# Patient Record
Sex: FEMALE | Race: WHITE | Employment: FULL TIME | URBAN - METROPOLITAN AREA
[De-identification: names, ages, dates, MRNs, and addresses within clinical notes are randomized per-mention and may not be internally consistent; named-entity substitution may affect disease eponyms.]

---

## 2024-06-28 PROBLEM — E66.812 CLASS 2 SEVERE OBESITY WITH SERIOUS COMORBIDITY IN ADULT (HCC): Status: ACTIVE | Noted: 2024-06-28

## 2024-08-02 ENCOUNTER — HOSPITAL ENCOUNTER (OUTPATIENT)
Age: 34
Discharge: HOME | End: 2024-08-02
Attending: OBSTETRICS & GYNECOLOGY | Admitting: OBSTETRICS & GYNECOLOGY
Payer: COMMERCIAL

## 2024-08-02 VITALS — DIASTOLIC BLOOD PRESSURE: 59 MMHG | SYSTOLIC BLOOD PRESSURE: 128 MMHG | HEART RATE: 70 BPM | RESPIRATION RATE: 18 BRPM

## 2024-08-02 LAB
ALBUMIN SERPL-MCNC: 3.2 G/DL (ref 3.5–5.2)
ALP SERPL-CCNC: 164 U/L (ref 35–104)
ALT SERPL-CCNC: 8 U/L (ref 0–32)
ANION GAP SERPL CALCULATED.3IONS-SCNC: 13 MMOL/L (ref 7–16)
AST SERPL-CCNC: 12 U/L (ref 0–31)
BACTERIA URNS QL MICRO: ABNORMAL
BASOPHILS # BLD: 0.02 K/UL (ref 0–0.2)
BASOPHILS NFR BLD: 0 % (ref 0–2)
BILIRUB SERPL-MCNC: 0.2 MG/DL (ref 0–1.2)
BILIRUB UR QL STRIP: NEGATIVE
BUN SERPL-MCNC: 7 MG/DL (ref 6–20)
CALCIUM SERPL-MCNC: 8.5 MG/DL (ref 8.6–10.2)
CHLORIDE SERPL-SCNC: 105 MMOL/L (ref 98–107)
CLARITY UR: CLEAR
CO2 SERPL-SCNC: 19 MMOL/L (ref 22–29)
COLOR UR: YELLOW
CREAT SERPL-MCNC: 0.5 MG/DL (ref 0.5–1)
CREAT UR-MCNC: 55.1 MG/DL (ref 29–226)
EOSINOPHIL # BLD: 0.1 K/UL (ref 0.05–0.5)
EOSINOPHILS RELATIVE PERCENT: 1 % (ref 0–6)
ERYTHROCYTE [DISTWIDTH] IN BLOOD BY AUTOMATED COUNT: 14.3 % (ref 11.5–15)
GFR, ESTIMATED: >90 ML/MIN/1.73M2
GLUCOSE SERPL-MCNC: 101 MG/DL (ref 74–99)
GLUCOSE UR STRIP-MCNC: NEGATIVE MG/DL
HCT VFR BLD AUTO: 34.7 % (ref 34–48)
HGB BLD-MCNC: 11.2 G/DL (ref 11.5–15.5)
HGB UR QL STRIP.AUTO: NEGATIVE
IMM GRANULOCYTES # BLD AUTO: <0.03 K/UL (ref 0–0.58)
IMM GRANULOCYTES NFR BLD: 0 % (ref 0–5)
KETONES UR STRIP-MCNC: NEGATIVE MG/DL
LEUKOCYTE ESTERASE UR QL STRIP: NEGATIVE
LYMPHOCYTES NFR BLD: 2.34 K/UL (ref 1.5–4)
LYMPHOCYTES RELATIVE PERCENT: 32 % (ref 20–42)
MCH RBC QN AUTO: 28.1 PG (ref 26–35)
MCHC RBC AUTO-ENTMCNC: 32.3 G/DL (ref 32–34.5)
MCV RBC AUTO: 87.2 FL (ref 80–99.9)
MONOCYTES NFR BLD: 0.9 K/UL (ref 0.1–0.95)
MONOCYTES NFR BLD: 12 % (ref 2–12)
NEUTROPHILS NFR BLD: 53 % (ref 43–80)
NEUTS SEG NFR BLD: 3.86 K/UL (ref 1.8–7.3)
NITRITE UR QL STRIP: NEGATIVE
PH UR STRIP: 7 [PH] (ref 5–9)
PLATELET # BLD AUTO: 195 K/UL (ref 130–450)
PMV BLD AUTO: 10.2 FL (ref 7–12)
POTASSIUM SERPL-SCNC: 3.6 MMOL/L (ref 3.5–5)
PROT SERPL-MCNC: 6.3 G/DL (ref 6.4–8.3)
PROT UR STRIP-MCNC: NEGATIVE MG/DL
RBC # BLD AUTO: 3.98 M/UL (ref 3.5–5.5)
RBC #/AREA URNS HPF: ABNORMAL /HPF
SODIUM SERPL-SCNC: 137 MMOL/L (ref 132–146)
SP GR UR STRIP: 1.02 (ref 1–1.03)
TOTAL PROTEIN, URINE: 11 MG/DL (ref 0–12)
URATE SERPL-MCNC: 3.5 MG/DL (ref 2.4–5.7)
URINE TOTAL PROTEIN CREATININE RATIO: 0.2 (ref 0–0.2)
UROBILINOGEN UR STRIP-ACNC: 0.2 EU/DL (ref 0–1)
WBC #/AREA URNS HPF: ABNORMAL /HPF
WBC OTHER # BLD: 7.2 K/UL (ref 4.5–11.5)

## 2024-08-02 PROCEDURE — 80053 COMPREHEN METABOLIC PANEL: CPT

## 2024-08-02 PROCEDURE — 81001 URINALYSIS AUTO W/SCOPE: CPT

## 2024-08-02 PROCEDURE — 84550 ASSAY OF BLOOD/URIC ACID: CPT

## 2024-08-02 PROCEDURE — 85025 COMPLETE CBC W/AUTO DIFF WBC: CPT

## 2024-08-02 PROCEDURE — 99212 OFFICE O/P EST SF 10 MIN: CPT | Performed by: STUDENT IN AN ORGANIZED HEALTH CARE EDUCATION/TRAINING PROGRAM

## 2024-08-02 PROCEDURE — 84156 ASSAY OF PROTEIN URINE: CPT

## 2024-08-02 PROCEDURE — 82570 ASSAY OF URINE CREATININE: CPT

## 2024-08-02 NOTE — PROGRESS NOTES
Patient arrived to unit with complaints of decreased fetal movement since this morning. Patient reports she has anterior placenta, denies cramping, bleeding, or LOF. Placed on EFM, will notify house officer of arrival.

## 2024-08-02 NOTE — PROGRESS NOTES
Called Dr. Ivan to update regarding resulted labs, physician would like to cycle blood pressures until systolic less than 140. BP cycling every 20 min. Call physician back with update.

## 2024-08-02 NOTE — PROGRESS NOTES
Patient reports feeling movement, per Dr Ruelas reactive EFM tracing noted with accelerations and no decelerations. She denies any ctx pain or cramping. May take off of EFM and obtain [facility] labs, call Dr. Ivan with results.

## 2024-08-02 NOTE — H&P
Department of Obstetrics and Gynecology  Labor and Delivery  Attending History and Pysical       Subjective:   Charlie Nguyen is a 34 y.o. female  at 38w1d here for DFM. However since being in triage reports feeling baby move again.     -LOF, -VB, -Contractions  No preeclampsia symptoms of HA, visual changes,  RUQ pain.      OB History    Para Term  AB Living   1 0 0 0 0 0   SAB IAB Ectopic Molar Multiple Live Births   0 0 0 0 0 0      # Outcome Date GA Lbr Alvin/2nd Weight Sex Delivery Anes PTL Lv   1 Current                Review of Systems  Skin: no changes  All other review of systems negative    Past Medical History  Past Medical History:   Diagnosis Date    Asthma     HLD (hyperlipidemia)     Vitamin D deficiency        Past Surgical History  Past Surgical History:   Procedure Laterality Date    CHOLECYSTECTOMY      laparoscopic    TONSILLECTOMY AND ADENOIDECTOMY      WISDOM TOOTH EXTRACTION         Allergies  Allergies   Allergen Reactions    Iodides Hives and Itching       Family History: Non contributory    Social History: Denies smoking, alcohol, drugs    Physical Exam:  Vitals:    24 1600   BP: (!) 142/80   Pulse: 76   Resp: 18      CONSTITUTIONAL:  awake, alert, cooperative, no apparent distress  LUNGS:  No increased work of breathing  ABDOMEN:  no rebound or guarding . gravid  EXTREMETIES:  Minimal edema.    SVE: deferred    FHRT:  Baseline: normal  Variability: Moderate  Accels: Present  Decels: None     [place]: no reg ctxs    Membranes: Intact      BSUS: DVP 4.7    Assessment/Plan:      Charlie Nguyen is a 34 y.o.  at 38w1d here with DFM. Reactive tracing and reassuring DVP. However first BP is mild range. She reports being nervous before being put on monitor. Plan for [facility] labs and cycle Bps.  informed and will decide on discharge based on results    Mitchell Ruelas MD, MPH

## 2024-08-03 NOTE — PROGRESS NOTES
Patient given verbal and written discharge instructions with standard labor precautions instructed to keep follow up appointment with physician. Patient verbalizes understanding states no questions or concerns. Patient ambulatory off unit with family member.

## 2024-08-08 ENCOUNTER — ANESTHESIA (OUTPATIENT)
Dept: LABOR AND DELIVERY | Age: 34
End: 2024-08-08
Payer: COMMERCIAL

## 2024-08-08 ENCOUNTER — ANESTHESIA EVENT (OUTPATIENT)
Dept: LABOR AND DELIVERY | Age: 34
End: 2024-08-08
Payer: COMMERCIAL

## 2024-08-08 ENCOUNTER — HOSPITAL ENCOUNTER (INPATIENT)
Age: 34
LOS: 3 days | Discharge: HOME | End: 2024-08-11
Attending: OBSTETRICS & GYNECOLOGY | Admitting: OBSTETRICS & GYNECOLOGY
Payer: COMMERCIAL

## 2024-08-08 DIAGNOSIS — G89.18 POSTOPERATIVE PAIN: Primary | ICD-10-CM

## 2024-08-08 PROBLEM — Z3A.39 39 WEEKS GESTATION OF PREGNANCY: Status: ACTIVE | Noted: 2024-08-08

## 2024-08-08 LAB
ABO + RH BLD: NORMAL
AMNISURE, POC: POSITIVE
AMPHET UR QL SCN: NEGATIVE
ARM BAND NUMBER: NORMAL
BARBITURATES UR QL SCN: NEGATIVE
BENZODIAZ UR QL: NEGATIVE
BLOOD BANK SAMPLE EXPIRATION: NORMAL
BLOOD GROUP ANTIBODIES SERPL: NEGATIVE
BUPRENORPHINE UR QL: NEGATIVE
CANNABINOIDS UR QL SCN: NEGATIVE
COCAINE UR QL SCN: NEGATIVE
ERYTHROCYTE [DISTWIDTH] IN BLOOD BY AUTOMATED COUNT: 14.3 % (ref 11.5–15)
FENTANYL UR QL: NEGATIVE
HCT VFR BLD AUTO: 36.9 % (ref 34–48)
HGB BLD-MCNC: 11.8 G/DL (ref 11.5–15.5)
Lab: NORMAL
MCH RBC QN AUTO: 27.7 PG (ref 26–35)
MCHC RBC AUTO-ENTMCNC: 32 G/DL (ref 32–34.5)
MCV RBC AUTO: 86.6 FL (ref 80–99.9)
METHADONE UR QL: NEGATIVE
NEGATIVE QC PASS/FAIL: NORMAL
OPIATES UR QL SCN: NEGATIVE
OXYCODONE UR QL SCN: NEGATIVE
PCP UR QL SCN: NEGATIVE
PLATELET # BLD AUTO: 227 K/UL (ref 130–450)
PMV BLD AUTO: 10.8 FL (ref 7–12)
POSITIVE QC PASS/FAIL: NORMAL
RBC # BLD AUTO: 4.26 M/UL (ref 3.5–5.5)
TEST INFORMATION: NORMAL
WBC OTHER # BLD: 7.6 K/UL (ref 4.5–11.5)

## 2024-08-08 PROCEDURE — 86900 BLOOD TYPING SEROLOGIC ABO: CPT

## 2024-08-08 PROCEDURE — 3700000025 EPIDURAL BLOCK: Performed by: ANESTHESIOLOGY

## 2024-08-08 PROCEDURE — 85027 COMPLETE CBC AUTOMATED: CPT

## 2024-08-08 PROCEDURE — 80307 DRUG TEST PRSMV CHEM ANLYZR: CPT

## 2024-08-08 PROCEDURE — 2580000003 HC RX 258: Performed by: OBSTETRICS & GYNECOLOGY

## 2024-08-08 PROCEDURE — 51701 INSERT BLADDER CATHETER: CPT

## 2024-08-08 PROCEDURE — 86901 BLOOD TYPING SEROLOGIC RH(D): CPT

## 2024-08-08 PROCEDURE — 1220000001 HC SEMI PRIVATE L&D R&B

## 2024-08-08 PROCEDURE — 86850 RBC ANTIBODY SCREEN: CPT

## 2024-08-08 PROCEDURE — 6360000002 HC RX W HCPCS: Performed by: OBSTETRICS & GYNECOLOGY

## 2024-08-08 PROCEDURE — APPNB15 APP NON BILLABLE TIME 0-15 MINS

## 2024-08-08 RX ORDER — MISOPROSTOL 200 UG/1
400 TABLET ORAL PRN
Status: DISCONTINUED | OUTPATIENT
Start: 2024-08-08 | End: 2024-08-09

## 2024-08-08 RX ORDER — METHYLERGONOVINE MALEATE 0.2 MG/ML
200 INJECTION INTRAVENOUS PRN
Status: DISCONTINUED | OUTPATIENT
Start: 2024-08-08 | End: 2024-08-09

## 2024-08-08 RX ORDER — TERBUTALINE SULFATE 1 MG/ML
0.25 INJECTION, SOLUTION SUBCUTANEOUS
Status: DISCONTINUED | OUTPATIENT
Start: 2024-08-08 | End: 2024-08-09

## 2024-08-08 RX ORDER — ONDANSETRON 2 MG/ML
4 INJECTION INTRAMUSCULAR; INTRAVENOUS EVERY 6 HOURS PRN
Status: DISCONTINUED | OUTPATIENT
Start: 2024-08-08 | End: 2024-08-09

## 2024-08-08 RX ORDER — SODIUM CHLORIDE 0.9 % (FLUSH) 0.9 %
5-40 SYRINGE (ML) INJECTION EVERY 12 HOURS SCHEDULED
Status: DISCONTINUED | OUTPATIENT
Start: 2024-08-08 | End: 2024-08-09

## 2024-08-08 RX ORDER — SODIUM CHLORIDE, SODIUM LACTATE, POTASSIUM CHLORIDE, AND CALCIUM CHLORIDE .6; .31; .03; .02 G/100ML; G/100ML; G/100ML; G/100ML
500 INJECTION, SOLUTION INTRAVENOUS PRN
Status: DISCONTINUED | OUTPATIENT
Start: 2024-08-08 | End: 2024-08-09

## 2024-08-08 RX ORDER — SODIUM CHLORIDE 0.9 % (FLUSH) 0.9 %
5-40 SYRINGE (ML) INJECTION PRN
Status: DISCONTINUED | OUTPATIENT
Start: 2024-08-08 | End: 2024-08-09

## 2024-08-08 RX ORDER — ACETAMINOPHEN 650 MG
TABLET, EXTENDED RELEASE ORAL
Status: DISCONTINUED
Start: 2024-08-08 | End: 2024-08-09

## 2024-08-08 RX ORDER — TRANEXAMIC ACID 10 MG/ML
1000 INJECTION, SOLUTION INTRAVENOUS
Status: DISCONTINUED | OUTPATIENT
Start: 2024-08-08 | End: 2024-08-09

## 2024-08-08 RX ORDER — ACETAMINOPHEN 325 MG/1
650 TABLET ORAL EVERY 4 HOURS PRN
Status: DISCONTINUED | OUTPATIENT
Start: 2024-08-08 | End: 2024-08-10 | Stop reason: SDUPTHER

## 2024-08-08 RX ORDER — SODIUM CHLORIDE, SODIUM LACTATE, POTASSIUM CHLORIDE, CALCIUM CHLORIDE 600; 310; 30; 20 MG/100ML; MG/100ML; MG/100ML; MG/100ML
INJECTION, SOLUTION INTRAVENOUS CONTINUOUS
Status: DISCONTINUED | OUTPATIENT
Start: 2024-08-08 | End: 2024-08-09

## 2024-08-08 RX ORDER — NALOXONE HYDROCHLORIDE 0.4 MG/ML
INJECTION, SOLUTION INTRAMUSCULAR; INTRAVENOUS; SUBCUTANEOUS PRN
Status: DISCONTINUED | OUTPATIENT
Start: 2024-08-08 | End: 2024-08-09

## 2024-08-08 RX ORDER — ONDANSETRON 4 MG/1
4 TABLET, ORALLY DISINTEGRATING ORAL EVERY 6 HOURS PRN
Status: DISCONTINUED | OUTPATIENT
Start: 2024-08-08 | End: 2024-08-09

## 2024-08-08 RX ORDER — LIDOCAINE HYDROCHLORIDE 10 MG/ML
INJECTION, SOLUTION INFILTRATION; PERINEURAL
Status: DISCONTINUED
Start: 2024-08-08 | End: 2024-08-09

## 2024-08-08 RX ORDER — SODIUM CHLORIDE 9 MG/ML
25 INJECTION, SOLUTION INTRAVENOUS PRN
Status: DISCONTINUED | OUTPATIENT
Start: 2024-08-08 | End: 2024-08-09

## 2024-08-08 RX ORDER — CARBOPROST TROMETHAMINE 250 UG/ML
250 INJECTION, SOLUTION INTRAMUSCULAR PRN
Status: DISCONTINUED | OUTPATIENT
Start: 2024-08-08 | End: 2024-08-09

## 2024-08-08 RX ADMIN — SODIUM CHLORIDE, POTASSIUM CHLORIDE, SODIUM LACTATE AND CALCIUM CHLORIDE: 600; 310; 30; 20 INJECTION, SOLUTION INTRAVENOUS at 16:12

## 2024-08-08 RX ADMIN — Medication 1 MILLI-UNITS/MIN: at 09:06

## 2024-08-08 RX ADMIN — Medication 10 MILLI-UNITS/MIN: at 19:37

## 2024-08-08 RX ADMIN — SODIUM CHLORIDE, POTASSIUM CHLORIDE, SODIUM LACTATE AND CALCIUM CHLORIDE: 600; 310; 30; 20 INJECTION, SOLUTION INTRAVENOUS at 08:14

## 2024-08-08 RX ADMIN — Medication 14 MILLI-UNITS/MIN: at 21:57

## 2024-08-08 RX ADMIN — Medication 12 MILLI-UNITS/MIN: at 20:51

## 2024-08-08 NOTE — ANESTHESIA PROCEDURE NOTES
Epidural Block    Patient location during procedure: OB  Reason for block: labor epidural  Staffing  Performed: other anesthesia staff   Resident/CRNA: Constantin Knowles APRN - CRNA  Other anesthesia staff: Sissy Mosquera RN  Performed by: Constantin Knowles APRN - CRNA  Authorized by: Wenyd Carter DO    Epidural  Patient position: sitting  Prep: ChloraPrep  Patient monitoring: continuous pulse ox and frequent blood pressure checks  Approach: midline  Location: L3-4  Injection technique: [place] air  Provider prep: mask and sterile gloves  Needle  Needle type: Tuohy   Needle gauge: 17 G  Needle length: 3.5 in  Needle insertion depth: 7 cm  Catheter type: end hole  Catheter size: 20 G (20 G)  Catheter at skin depth: 15 cm  Test dose: negativeCatheter Secured: tegaderm and tape  Assessment  Sensory level: T4  Hemodynamics: stable  Attempts: 1  Outcomes: uncomplicated and patient tolerated procedure well  Preanesthetic Checklist  Completed: patient identified, IV checked, site marked, risks and benefits discussed, surgical/procedural consents, equipment checked, pre-op evaluation, timeout performed, anesthesia consent given, oxygen available and monitors applied/VS acknowledged

## 2024-08-08 NOTE — ANESTHESIA PRE PROCEDURE
Department of Anesthesiology  Preprocedure Note       Name:  Charlie Jesus   Age:  34 y.o.  :  1990                                          MRN:  [id number]         Date:  2024      Surgeon: * No surgeons listed *    Procedure: * No procedures listed *    Medications prior to admission:   Prior to Admission medications    Medication Sig Start Date End Date Taking? Authorizing Provider   DULERA 200-5 MCG/ACT inhaler Inhale 2 puffs into the lungs in the morning and 2 puffs in the evening. 24   ProviderRodger MD   Prenatal MV & Min w/FA-DHA (PRENATAL ADULT GUMMY/DHA/FA PO)  12/15/23   ProviderRodger MD   Cholecalciferol (VITAMIN D) 50 MCG ( UT) CAPS capsule Take by mouth    ProviderRodger MD   albuterol sulfate HFA (VENTOLIN HFA) 108 (90 Base) MCG/ACT inhaler Inhale 2 puffs into the lungs 4 times daily as needed for Wheezing 23   Kailyn Hudson DO       Current medications:    Current Facility-Administered Medications   Medication Dose Route Frequency Provider Last Rate Last Admin    terbutaline (BRETHINE) injection 0.25 mg  0.25 mg SubCUTAneous Once PRN Brando Ivan MD        lactated ringers IV soln infusion   IntraVENous Continuous Brando Ivan  mL/hr at 24 0814 New Bag at 24 0814    lactated ringers bolus 500 mL  500 mL IntraVENous PRN Brando Ivan MD        Or    lactated ringers bolus 500 mL  500 mL IntraVENous PRN Brando Ivan MD        sodium chloride flush 0.9 % injection 5-40 mL  5-40 mL IntraVENous 2 times per day Brando Ivan MD        sodium chloride flush 0.9 % injection 5-40 mL  5-40 mL IntraVENous PRN Brando Ivan MD        0.9 % sodium chloride infusion  25 mL IntraVENous PRN Brando Ivan MD        ondansetron (ZOFRAN) injection 4 mg  4 mg IntraVENous Q6H PRN Brando Ivan MD        Or    ondansetron (ZOFRAN-ODT) disintegrating tablet 4 mg  4 mg Oral Q6H PRN Moncho

## 2024-08-08 NOTE — PROGRESS NOTES
Patient sitting up for epidural. RN and CRNA at bedside. Timeout completed. Pt rates pain 10/10 during ctx, 0 without ctx.

## 2024-08-08 NOTE — H&P
Department of Obstetrics and Gynecology  Labor and Delivery  History & Physical    Patient:  Charlie Jesus     Admit Date:  2024  5:36 AM  Medical Record Number:  [id number]    CHIEF COMPLAINT:  contractions, leakage of amniotic fluid    PROBLEM LIST:     Patient Active Problem List   Diagnosis    Asthma    Mixed hyperlipidemia    Rh negative state in antepartum period    Primigravida in third trimester    Vitamin D deficiency    Class 2 severe obesity with serious comorbidity in adult (HCC)           HISTORY OF PRESENT ILLNESS:    The patient is a 34 y.o. female  at 39w0d.  Patient presents with a chief complaint as above and is being admitted for contractions and LOF at 0300. Fluid clear to pink tinged now, ctxs about q 10 minutes. +FM. Denies VB.    ESTIMATED DUE DATE: Estimated Date of Delivery: 8/15/24    PRENATAL CARE:  Complicated by: asthma, obesity, Rh negative  GBS: negative    Past OB History  OB History          1    Para   0    Term   0       0    AB   0    Living   0         SAB   0    IAB   0    Ectopic   0    Molar   0    Multiple   0    Live Births   0                Past Medical History:        Diagnosis Date    Asthma     HLD (hyperlipidemia)     Vitamin D deficiency        Past Surgical History:        Procedure Laterality Date    CHOLECYSTECTOMY      laparoscopic    TONSILLECTOMY AND ADENOIDECTOMY      WISDOM TOOTH EXTRACTION         Allergies:  Iodides    Social History:    Social History     Socioeconomic History    Marital status:      Spouse name: Not on file    Number of children: Not on file    Years of education: Not on file    Highest education level: Not on file   Occupational History    Not on file   Tobacco Use    Smoking status: Never    Smokeless tobacco: Never   Vaping Use    Vaping Use: Never used   Substance and Sexual Activity    Alcohol use: Not Currently    Drug use: Never    Sexual activity: Yes     Partners: Male     Birth control/protection:

## 2024-08-08 NOTE — PROGRESS NOTES
Pt is  a  39w0d presenting with c/o rupture of membranes and contractions. Pt denies VB. +FM. Pt denies any complications with this pregnancy. Pt placed on EF. Call light within reach.

## 2024-08-08 NOTE — PROGRESS NOTES
Assumed care of patient. Pt on birthing ball with  and bedside. EFM adjusted. Reports +FM. Denies vaginal bleeding.  Call light within reach.     Billing Type: Third-Party Bill

## 2024-08-09 PROBLEM — O26.893 RH NEGATIVE STATUS DURING PREGNANCY IN THIRD TRIMESTER: Status: ACTIVE | Noted: 2024-08-09

## 2024-08-09 PROBLEM — O99.213 OBESITY AFFECTING PREGNANCY IN THIRD TRIMESTER: Status: ACTIVE | Noted: 2024-08-09

## 2024-08-09 PROBLEM — O36.63X0 LARGE FOR DATES FETUS AFFECTING PREGNANCY IN THIRD TRIMESTER, ANTEPARTUM: Status: ACTIVE | Noted: 2024-08-09

## 2024-08-09 PROBLEM — J45.909 ASTHMA AFFECTING PREGNANCY IN THIRD TRIMESTER: Status: ACTIVE | Noted: 2024-08-09

## 2024-08-09 PROBLEM — O99.513 ASTHMA AFFECTING PREGNANCY IN THIRD TRIMESTER: Status: ACTIVE | Noted: 2024-08-09

## 2024-08-09 PROBLEM — Z67.91 RH NEGATIVE STATUS DURING PREGNANCY IN THIRD TRIMESTER: Status: ACTIVE | Noted: 2024-08-09

## 2024-08-09 PROCEDURE — 59514 CESAREAN DELIVERY ONLY: CPT | Performed by: STUDENT IN AN ORGANIZED HEALTH CARE EDUCATION/TRAINING PROGRAM

## 2024-08-09 PROCEDURE — 2580000003 HC RX 258

## 2024-08-09 PROCEDURE — 3609079900 HC CESAREAN SECTION: Performed by: OBSTETRICS & GYNECOLOGY

## 2024-08-09 PROCEDURE — 6370000000 HC RX 637 (ALT 250 FOR IP)

## 2024-08-09 PROCEDURE — 2709999900 HC NON-CHARGEABLE SUPPLY: Performed by: OBSTETRICS & GYNECOLOGY

## 2024-08-09 PROCEDURE — 3700000000 HC ANESTHESIA ATTENDED CARE: Performed by: OBSTETRICS & GYNECOLOGY

## 2024-08-09 PROCEDURE — 6370000000 HC RX 637 (ALT 250 FOR IP): Performed by: OBSTETRICS & GYNECOLOGY

## 2024-08-09 PROCEDURE — 2500000003 HC RX 250 WO HCPCS

## 2024-08-09 PROCEDURE — 6360000002 HC RX W HCPCS: Performed by: OBSTETRICS & GYNECOLOGY

## 2024-08-09 PROCEDURE — 6360000002 HC RX W HCPCS: Performed by: NURSE ANESTHETIST, CERTIFIED REGISTERED

## 2024-08-09 PROCEDURE — 6360000002 HC RX W HCPCS

## 2024-08-09 PROCEDURE — 6360000002 HC RX W HCPCS: Performed by: ANESTHESIOLOGY

## 2024-08-09 PROCEDURE — 51701 INSERT BLADDER CATHETER: CPT

## 2024-08-09 PROCEDURE — 3700000001 HC ADD 15 MINUTES (ANESTHESIA): Performed by: OBSTETRICS & GYNECOLOGY

## 2024-08-09 PROCEDURE — 2580000003 HC RX 258: Performed by: OBSTETRICS & GYNECOLOGY

## 2024-08-09 PROCEDURE — 1220000000 HC SEMI PRIVATE OB R&B

## 2024-08-09 PROCEDURE — 2500000003 HC RX 250 WO HCPCS: Performed by: OBSTETRICS & GYNECOLOGY

## 2024-08-09 PROCEDURE — 7100000001 HC PACU RECOVERY - ADDTL 15 MIN: Performed by: OBSTETRICS & GYNECOLOGY

## 2024-08-09 PROCEDURE — 7100000000 HC PACU RECOVERY - FIRST 15 MIN: Performed by: OBSTETRICS & GYNECOLOGY

## 2024-08-09 PROCEDURE — 3E0234Z INTRODUCTION OF SERUM, TOXOID AND VACCINE INTO MUSCLE, PERCUTANEOUS APPROACH: ICD-10-PCS | Performed by: OBSTETRICS & GYNECOLOGY

## 2024-08-09 RX ORDER — DIPHENHYDRAMINE HCL 25 MG
25 TABLET ORAL EVERY 6 HOURS PRN
Status: ACTIVE | OUTPATIENT
Start: 2024-08-09 | End: 2024-08-10

## 2024-08-09 RX ORDER — MODIFIED LANOLIN
OINTMENT (GRAM) TOPICAL
Status: DISCONTINUED | OUTPATIENT
Start: 2024-08-09 | End: 2024-08-11 | Stop reason: HOSPADM

## 2024-08-09 RX ORDER — CITRIC ACID/SODIUM CITRATE 334-500MG
30 SOLUTION, ORAL ORAL ONCE
Status: COMPLETED | OUTPATIENT
Start: 2024-08-09 | End: 2024-08-09

## 2024-08-09 RX ORDER — KETOROLAC TROMETHAMINE 30 MG/ML
15 INJECTION, SOLUTION INTRAMUSCULAR; INTRAVENOUS EVERY 6 HOURS PRN
Status: DISPENSED | OUTPATIENT
Start: 2024-08-09 | End: 2024-08-10

## 2024-08-09 RX ORDER — WATER 10 ML/10ML
INJECTION INTRAMUSCULAR; INTRAVENOUS; SUBCUTANEOUS
Status: DISCONTINUED
Start: 2024-08-09 | End: 2024-08-09

## 2024-08-09 RX ORDER — OXYCODONE HYDROCHLORIDE 5 MG/1
10 TABLET ORAL EVERY 4 HOURS PRN
Status: ACTIVE | OUTPATIENT
Start: 2024-08-09 | End: 2024-08-10

## 2024-08-09 RX ORDER — SODIUM CHLORIDE, SODIUM LACTATE, POTASSIUM CHLORIDE, AND CALCIUM CHLORIDE .6; .31; .03; .02 G/100ML; G/100ML; G/100ML; G/100ML
1000 INJECTION, SOLUTION INTRAVENOUS ONCE
Status: DISCONTINUED | OUTPATIENT
Start: 2024-08-09 | End: 2024-08-09

## 2024-08-09 RX ORDER — FERROUS SULFATE 325(65) MG
325 TABLET ORAL 2 TIMES DAILY WITH MEALS
Status: DISCONTINUED | OUTPATIENT
Start: 2024-08-10 | End: 2024-08-11 | Stop reason: HOSPADM

## 2024-08-09 RX ORDER — SODIUM CHLORIDE 9 MG/ML
INJECTION, SOLUTION INTRAVENOUS PRN
Status: DISCONTINUED | OUTPATIENT
Start: 2024-08-09 | End: 2024-08-11 | Stop reason: HOSPADM

## 2024-08-09 RX ORDER — DOCUSATE SODIUM 100 MG/1
100 CAPSULE, LIQUID FILLED ORAL 2 TIMES DAILY
Status: DISCONTINUED | OUTPATIENT
Start: 2024-08-09 | End: 2024-08-11 | Stop reason: HOSPADM

## 2024-08-09 RX ORDER — BUPIVACAINE HYDROCHLORIDE 5 MG/ML
INJECTION, SOLUTION EPIDURAL; INTRACAUDAL PRN
Status: DISCONTINUED | OUTPATIENT
Start: 2024-08-09 | End: 2024-08-09 | Stop reason: SDUPTHER

## 2024-08-09 RX ORDER — SIMETHICONE 80 MG
80 TABLET,CHEWABLE ORAL EVERY 6 HOURS PRN
Status: DISCONTINUED | OUTPATIENT
Start: 2024-08-09 | End: 2024-08-11 | Stop reason: HOSPADM

## 2024-08-09 RX ORDER — LIDOCAINE HYDROCHLORIDE 20 MG/ML
INJECTION, SOLUTION EPIDURAL; INFILTRATION; INTRACAUDAL; PERINEURAL PRN
Status: DISCONTINUED | OUTPATIENT
Start: 2024-08-09 | End: 2024-08-09 | Stop reason: SDUPTHER

## 2024-08-09 RX ORDER — MORPHINE SULFATE 10 MG/ML
INJECTION, SOLUTION INTRAMUSCULAR; INTRAVENOUS PRN
Status: DISCONTINUED | OUTPATIENT
Start: 2024-08-09 | End: 2024-08-09 | Stop reason: SDUPTHER

## 2024-08-09 RX ORDER — CITRIC ACID/SODIUM CITRATE 334-500MG
SOLUTION, ORAL ORAL
Status: COMPLETED
Start: 2024-08-09 | End: 2024-08-09

## 2024-08-09 RX ORDER — ONDANSETRON 2 MG/ML
4 INJECTION INTRAMUSCULAR; INTRAVENOUS EVERY 6 HOURS PRN
Status: DISCONTINUED | OUTPATIENT
Start: 2024-08-09 | End: 2024-08-09

## 2024-08-09 RX ORDER — FENTANYL CITRATE 50 UG/ML
INJECTION, SOLUTION INTRAMUSCULAR; INTRAVENOUS PRN
Status: DISCONTINUED | OUTPATIENT
Start: 2024-08-09 | End: 2024-08-09 | Stop reason: SDUPTHER

## 2024-08-09 RX ORDER — OXYCODONE HYDROCHLORIDE 5 MG/1
5 TABLET ORAL EVERY 4 HOURS PRN
Status: ACTIVE | OUTPATIENT
Start: 2024-08-09 | End: 2024-08-10

## 2024-08-09 RX ORDER — DIPHENHYDRAMINE HYDROCHLORIDE 50 MG/ML
25 INJECTION INTRAMUSCULAR; INTRAVENOUS EVERY 6 HOURS PRN
Status: ACTIVE | OUTPATIENT
Start: 2024-08-09 | End: 2024-08-10

## 2024-08-09 RX ORDER — PRENATAL WITH FERROUS FUM AND FOLIC ACID 3080; 920; 120; 400; 22; 1.84; 3; 20; 10; 1; 12; 200; 27; 25; 2 [IU]/1; [IU]/1; MG/1; [IU]/1; MG/1; MG/1; MG/1; MG/1; MG/1; MG/1; UG/1; MG/1; MG/1; MG/1; MG/1
1 TABLET ORAL
Status: DISCONTINUED | OUTPATIENT
Start: 2024-08-10 | End: 2024-08-11 | Stop reason: HOSPADM

## 2024-08-09 RX ORDER — METRONIDAZOLE 500 MG/1
500 TABLET ORAL EVERY 8 HOURS SCHEDULED
Status: DISCONTINUED | OUTPATIENT
Start: 2024-08-09 | End: 2024-08-11 | Stop reason: HOSPADM

## 2024-08-09 RX ORDER — SODIUM CHLORIDE, SODIUM LACTATE, POTASSIUM CHLORIDE, CALCIUM CHLORIDE 600; 310; 30; 20 MG/100ML; MG/100ML; MG/100ML; MG/100ML
INJECTION, SOLUTION INTRAVENOUS CONTINUOUS PRN
Status: DISCONTINUED | OUTPATIENT
Start: 2024-08-09 | End: 2024-08-09 | Stop reason: SDUPTHER

## 2024-08-09 RX ORDER — ALBUTEROL SULFATE 90 UG/1
2 AEROSOL, METERED RESPIRATORY (INHALATION) 4 TIMES DAILY PRN
Status: DISCONTINUED | OUTPATIENT
Start: 2024-08-09 | End: 2024-08-09 | Stop reason: CLARIF

## 2024-08-09 RX ORDER — BISACODYL 10 MG
10 SUPPOSITORY, RECTAL RECTAL DAILY PRN
Status: DISCONTINUED | OUTPATIENT
Start: 2024-08-11 | End: 2024-08-11 | Stop reason: HOSPADM

## 2024-08-09 RX ORDER — LIDOCAINE HYDROCHLORIDE 20 MG/ML
INJECTION, SOLUTION INTRAVENOUS PRN
Status: DISCONTINUED | OUTPATIENT
Start: 2024-08-09 | End: 2024-08-09

## 2024-08-09 RX ORDER — ONDANSETRON 2 MG/ML
INJECTION INTRAMUSCULAR; INTRAVENOUS PRN
Status: DISCONTINUED | OUTPATIENT
Start: 2024-08-09 | End: 2024-08-09 | Stop reason: SDUPTHER

## 2024-08-09 RX ORDER — SODIUM CHLORIDE, SODIUM LACTATE, POTASSIUM CHLORIDE, CALCIUM CHLORIDE 600; 310; 30; 20 MG/100ML; MG/100ML; MG/100ML; MG/100ML
INJECTION, SOLUTION INTRAVENOUS CONTINUOUS
Status: DISCONTINUED | OUTPATIENT
Start: 2024-08-09 | End: 2024-08-11 | Stop reason: HOSPADM

## 2024-08-09 RX ORDER — NALOXONE HYDROCHLORIDE 0.4 MG/ML
INJECTION, SOLUTION INTRAMUSCULAR; INTRAVENOUS; SUBCUTANEOUS PRN
Status: ACTIVE | OUTPATIENT
Start: 2024-08-09 | End: 2024-08-10

## 2024-08-09 RX ORDER — DIPHENHYDRAMINE HYDROCHLORIDE 50 MG/ML
25 INJECTION INTRAMUSCULAR; INTRAVENOUS ONCE
Status: COMPLETED | OUTPATIENT
Start: 2024-08-09 | End: 2024-08-09

## 2024-08-09 RX ORDER — ONDANSETRON 2 MG/ML
4 INJECTION INTRAMUSCULAR; INTRAVENOUS EVERY 6 HOURS PRN
Status: ACTIVE | OUTPATIENT
Start: 2024-08-09 | End: 2024-08-10

## 2024-08-09 RX ORDER — CEFAZOLIN 3 G/1
INJECTION, POWDER, FOR SOLUTION INTRAVENOUS
Status: DISCONTINUED
Start: 2024-08-09 | End: 2024-08-09

## 2024-08-09 RX ORDER — SODIUM CHLORIDE 0.9 % (FLUSH) 0.9 %
5-40 SYRINGE (ML) INJECTION PRN
Status: DISCONTINUED | OUTPATIENT
Start: 2024-08-09 | End: 2024-08-11 | Stop reason: HOSPADM

## 2024-08-09 RX ORDER — ALBUTEROL SULFATE 2.5 MG/3ML
2.5 SOLUTION RESPIRATORY (INHALATION) 4 TIMES DAILY PRN
Status: DISCONTINUED | OUTPATIENT
Start: 2024-08-09 | End: 2024-08-11 | Stop reason: HOSPADM

## 2024-08-09 RX ORDER — SODIUM CHLORIDE 0.9 % (FLUSH) 0.9 %
5-40 SYRINGE (ML) INJECTION EVERY 12 HOURS SCHEDULED
Status: DISCONTINUED | OUTPATIENT
Start: 2024-08-09 | End: 2024-08-11 | Stop reason: HOSPADM

## 2024-08-09 RX ADMIN — FENTANYL CITRATE 100 MCG: 50 INJECTION, SOLUTION INTRAMUSCULAR; INTRAVENOUS at 17:50

## 2024-08-09 RX ADMIN — BUPIVACAINE HYDROCHLORIDE 5 ML: 5 INJECTION, SOLUTION EPIDURAL; INTRACAUDAL; PERINEURAL at 16:17

## 2024-08-09 RX ADMIN — LIDOCAINE HYDROCHLORIDE 5 ML: 20 INJECTION, SOLUTION EPIDURAL; INFILTRATION; INTRACAUDAL; PERINEURAL at 18:26

## 2024-08-09 RX ADMIN — MORPHINE SULFATE 2 MG: 10 INJECTION, SOLUTION INTRAMUSCULAR; INTRAVENOUS at 18:26

## 2024-08-09 RX ADMIN — Medication 909 ML/HR: at 18:21

## 2024-08-09 RX ADMIN — METRONIDAZOLE 500 MG: 500 TABLET ORAL at 21:42

## 2024-08-09 RX ADMIN — FENTANYL CITRATE 100 MCG: 50 INJECTION, SOLUTION INTRAMUSCULAR; INTRAVENOUS at 16:17

## 2024-08-09 RX ADMIN — PHENYLEPHRINE HYDROCHLORIDE 40 MCG/MIN: 10 INJECTION INTRAVENOUS at 18:05

## 2024-08-09 RX ADMIN — SODIUM CHLORIDE, POTASSIUM CHLORIDE, SODIUM LACTATE AND CALCIUM CHLORIDE: 600; 310; 30; 20 INJECTION, SOLUTION INTRAVENOUS at 17:44

## 2024-08-09 RX ADMIN — Medication 30 ML: at 17:52

## 2024-08-09 RX ADMIN — BUPIVACAINE HYDROCHLORIDE 5 ML: 5 INJECTION, SOLUTION EPIDURAL; INTRACAUDAL; PERINEURAL at 17:50

## 2024-08-09 RX ADMIN — ACETAMINOPHEN 650 MG: 325 TABLET ORAL at 09:50

## 2024-08-09 RX ADMIN — DIPHENHYDRAMINE HYDROCHLORIDE 25 MG: 50 INJECTION INTRAMUSCULAR; INTRAVENOUS at 12:50

## 2024-08-09 RX ADMIN — MORPHINE SULFATE 3 MG: 10 INJECTION, SOLUTION INTRAMUSCULAR; INTRAVENOUS at 18:25

## 2024-08-09 RX ADMIN — SODIUM CITRATE AND CITRIC ACID MONOHYDRATE 30 ML: 500; 334 SOLUTION ORAL at 17:52

## 2024-08-09 RX ADMIN — WATER 3000 MG: 1 INJECTION INTRAMUSCULAR; INTRAVENOUS; SUBCUTANEOUS at 17:52

## 2024-08-09 RX ADMIN — SODIUM CHLORIDE, POTASSIUM CHLORIDE, SODIUM LACTATE AND CALCIUM CHLORIDE: 600; 310; 30; 20 INJECTION, SOLUTION INTRAVENOUS at 18:51

## 2024-08-09 RX ADMIN — KETOROLAC TROMETHAMINE 15 MG: 30 INJECTION, SOLUTION INTRAMUSCULAR at 21:41

## 2024-08-09 RX ADMIN — BUPIVACAINE HYDROCHLORIDE 5 ML: 5 INJECTION, SOLUTION EPIDURAL; INTRACAUDAL; PERINEURAL at 18:00

## 2024-08-09 RX ADMIN — Medication 15 ML/HR: at 11:37

## 2024-08-09 RX ADMIN — ONDANSETRON 4 MG: 2 INJECTION INTRAMUSCULAR; INTRAVENOUS at 18:26

## 2024-08-09 RX ADMIN — FAMOTIDINE 20 MG: 10 INJECTION INTRAVENOUS at 21:42

## 2024-08-09 RX ADMIN — LIDOCAINE HYDROCHLORIDE 8 ML: 20 INJECTION, SOLUTION EPIDURAL; INFILTRATION; INTRACAUDAL; PERINEURAL at 17:50

## 2024-08-09 ASSESSMENT — PAIN DESCRIPTION - LOCATION
LOCATION: INCISION
LOCATION: HEAD

## 2024-08-09 ASSESSMENT — PAIN - FUNCTIONAL ASSESSMENT: PAIN_FUNCTIONAL_ASSESSMENT: ACTIVITIES ARE NOT PREVENTED

## 2024-08-09 ASSESSMENT — PAIN DESCRIPTION - DESCRIPTORS: DESCRIPTORS: SORE;TENDER

## 2024-08-09 ASSESSMENT — PAIN SCALES - GENERAL
PAINLEVEL_OUTOF10: 7
PAINLEVEL_OUTOF10: 5

## 2024-08-09 ASSESSMENT — PAIN DESCRIPTION - ORIENTATION: ORIENTATION: LOWER

## 2024-08-09 NOTE — OP NOTE
slow  and that it still may be several more hours longer and even with that if does not a guarantee vaginal birth.  She would prefer proceeding with the  section. Management options were reviewed, the risks of surgery were discussed, including, but not limited to that of anesthesia, infection, hemorrhage, transfusion, blood borne disease, bowel/bladder/ureteral/vascular injury, and any corrective surgeries (bladder, ureter, bowel, hysterectomy), uterine or cervical lacerations, injury to the baby, DVT,PE, pain and death.  Questions were answered to both the patient's and FOB's and informed consent was obtained to proceed as planned with a primary low-transverse  section.     Findings:  Infection Present At Time Of Surgery (PATOS) (choose all levels that have infection present):  No infection present  Other Findings:   Live Born 2024 at 1821  Sex:  Male  Fetal Position:  Cephalic, right occiput transverse  Apgars:  1 minute:  9; 5 minute:  9  Weight:  7# 15oz, 3590 grams  Nuchal Cord: Loose, x 1 was present and reduced  Placenta: was delivered with gentle traction and was noted to be intact, whole, and that the umbilical cord had three vessels noted.  Tubes, uterus, ovaries:  Normal    Specimens:   * No specimens in log *    Implants:  * No implants in log *      Condition:    Infant stable, transfer to to postanesthesia recovery then to General Care Nursery  Mother stable, transfer to post anesthesia recovery then to Maternity    Detailed Description of Procedure: The patient was taken to the operating room, where patient identification was confirmed and a Time Out was performed. Duramorph epidural anesthesia was found to be adequate.  She was prepped with a ChloraPrep solution and draped in the usual sterile fashion in the dorsal supine position with leftward tilt.  A Pfannenstiel skin incision was then made with a scalpel and carried down through the subcutaneous tissue to the underlying 
month(s)

## 2024-08-09 NOTE — PROGRESS NOTES
Spoke with Dr. Ivan, notified that patient is feeling like she wants a c/s and this nurse asked for him to come talk to her, states he will come talk to her.

## 2024-08-09 NOTE — PROGRESS NOTES
Spoke with Dr. Ivan, notified of sve still 7, swelling of cervix seems less per Chica RN, pit is at 16, ctx are 3-4, adequate strength but need to be more frequent. Patient being repositioned frequently and she is afebrile.   No new orders noted.

## 2024-08-09 NOTE — PROGRESS NOTES
L&D PROGRESS NOTE:     Patient:  Charlie Jesus     Admit Date:  2024  5:36 AM  Medical Record Number:  [id number]   Today's Date: 2024    S: Brando Alicea MD and or nursing requesting assessment of cervix.    34-year-old W female  1 para 0 at 39 weeks and 1 day with a pregnancy complicated by a fundal height larger than dates (38w4d EFW 7#10, 62.9%; RICHARDSON 17.56) maternal obesity (pregravid BMI 39.16), asthma (Dulera and albuterol inhaler PRN), Rh- status and vitamin D insufficiency who presented in spontaneous labor, sterling with rupture of membranes at 03:00 on 2024.  On examination, she was found to be sterling at 10-minute intervals and was 1 cm / 70%/-3 station.  Pitocin augmentation was initiated.  She received an epidural and slowly progressed to 7 cm / 80%/-1 station at 12:42 PM.  She was reassessed by nursing at 1500 and was still felt to be 7 cm but at 0 station.  She was reassessed by myself at 1730 and was felt to be 7 to 8 cm, 0 station with cervical swelling, molding and caput.  Throughout the entire course of labor patient was placed in multiple laboring positions and the fetal heart tracing remained reassuring.  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  Patient was advised that although she has made slow progress, the fetal heart tracing has been reassuring throughout - she was offered to continue with the trial of labor; however, she was concerned that up to this point progress has been very slow  and that it still may be several more hours longer and even with that if does not a guarantee vaginal birth.  She would prefer proceeding with the  section.     O:   Vitals:    24 1632 24 1637 24 1638 24 1702   BP:   139/80    Pulse:   91    Resp:       Temp:       TempSrc:       SpO2: 97% 96%  98%     FHR:  130, category 1  Cervix: 7 -8cm / 80% / soft / 0  TOCO:  3 minutes-4 minutes  Membranes: Intact.    A:34 y.o.

## 2024-08-09 NOTE — PROGRESS NOTES
FIRST ASSIST NOTE    PreOp DX:  39w1d Pregnancy     Failure to progress     Post OP Dx:  Same + live          Procedure:   section / Assistance     Anesthesia:  Regional     Due to lack of a resident, I was asked to assist Dr. Ivan  who performed a  section. I assisted with retraction and exposure and suture management/cutting throughout the case.

## 2024-08-10 PROBLEM — D62 ACUTE BLOOD LOSS AS CAUSE OF POSTOPERATIVE ANEMIA: Status: ACTIVE | Noted: 2024-08-10

## 2024-08-10 LAB
ERYTHROCYTE [DISTWIDTH] IN BLOOD BY AUTOMATED COUNT: 14.6 % (ref 11.5–15)
HCT VFR BLD AUTO: 33 % (ref 34–48)
HGB BLD-MCNC: 10.6 G/DL (ref 11.5–15.5)
MCH RBC QN AUTO: 27.7 PG (ref 26–35)
MCHC RBC AUTO-ENTMCNC: 32.1 G/DL (ref 32–34.5)
MCV RBC AUTO: 86.2 FL (ref 80–99.9)
PLATELET # BLD AUTO: 198 K/UL (ref 130–450)
PMV BLD AUTO: 10.6 FL (ref 7–12)
RBC # BLD AUTO: 3.83 M/UL (ref 3.5–5.5)
WBC OTHER # BLD: 11.4 K/UL (ref 4.5–11.5)

## 2024-08-10 PROCEDURE — 85027 COMPLETE CBC AUTOMATED: CPT

## 2024-08-10 PROCEDURE — 6360000002 HC RX W HCPCS: Performed by: OBSTETRICS & GYNECOLOGY

## 2024-08-10 PROCEDURE — 2580000003 HC RX 258: Performed by: OBSTETRICS & GYNECOLOGY

## 2024-08-10 PROCEDURE — 6370000000 HC RX 637 (ALT 250 FOR IP): Performed by: OBSTETRICS & GYNECOLOGY

## 2024-08-10 PROCEDURE — 1220000000 HC SEMI PRIVATE OB R&B

## 2024-08-10 RX ORDER — ONDANSETRON 4 MG/1
4 TABLET, ORALLY DISINTEGRATING ORAL EVERY 8 HOURS PRN
Status: DISCONTINUED | OUTPATIENT
Start: 2024-08-10 | End: 2024-08-11 | Stop reason: HOSPADM

## 2024-08-10 RX ORDER — OXYCODONE HYDROCHLORIDE 5 MG/1
10 TABLET ORAL EVERY 4 HOURS PRN
Status: DISCONTINUED | OUTPATIENT
Start: 2024-08-10 | End: 2024-08-11 | Stop reason: HOSPADM

## 2024-08-10 RX ORDER — ACETAMINOPHEN 500 MG
1000 TABLET ORAL EVERY 6 HOURS PRN
Status: DISCONTINUED | OUTPATIENT
Start: 2024-08-10 | End: 2024-08-11 | Stop reason: HOSPADM

## 2024-08-10 RX ORDER — IBUPROFEN 600 MG/1
600 TABLET ORAL EVERY 6 HOURS PRN
Status: DISCONTINUED | OUTPATIENT
Start: 2024-08-10 | End: 2024-08-11 | Stop reason: HOSPADM

## 2024-08-10 RX ORDER — ONDANSETRON 2 MG/ML
4 INJECTION INTRAMUSCULAR; INTRAVENOUS EVERY 6 HOURS PRN
Status: DISCONTINUED | OUTPATIENT
Start: 2024-08-10 | End: 2024-08-11 | Stop reason: HOSPADM

## 2024-08-10 RX ADMIN — ACETAMINOPHEN 1000 MG: 500 TABLET ORAL at 15:32

## 2024-08-10 RX ADMIN — SODIUM CHLORIDE, PRESERVATIVE FREE 10 ML: 5 INJECTION INTRAVENOUS at 04:40

## 2024-08-10 RX ADMIN — SODIUM CHLORIDE, PRESERVATIVE FREE 10 ML: 5 INJECTION INTRAVENOUS at 11:35

## 2024-08-10 RX ADMIN — METRONIDAZOLE 500 MG: 500 TABLET ORAL at 15:32

## 2024-08-10 RX ADMIN — METRONIDAZOLE 500 MG: 500 TABLET ORAL at 22:27

## 2024-08-10 RX ADMIN — SODIUM CHLORIDE, PRESERVATIVE FREE 10 ML: 5 INJECTION INTRAVENOUS at 18:40

## 2024-08-10 RX ADMIN — WATER 1000 MG: 1 INJECTION INTRAMUSCULAR; INTRAVENOUS; SUBCUTANEOUS at 11:34

## 2024-08-10 RX ADMIN — PRENATAL WITH FERROUS FUM AND FOLIC ACID 1 TABLET: 3080; 920; 120; 400; 22; 1.84; 3; 20; 10; 1; 12; 200; 27; 25; 2 TABLET ORAL at 08:33

## 2024-08-10 RX ADMIN — DOCUSATE SODIUM 100 MG: 100 CAPSULE, LIQUID FILLED ORAL at 08:33

## 2024-08-10 RX ADMIN — ACETAMINOPHEN 1000 MG: 500 TABLET ORAL at 08:33

## 2024-08-10 RX ADMIN — DOCUSATE SODIUM 100 MG: 100 CAPSULE, LIQUID FILLED ORAL at 20:30

## 2024-08-10 RX ADMIN — WATER 1000 MG: 1 INJECTION INTRAMUSCULAR; INTRAVENOUS; SUBCUTANEOUS at 04:00

## 2024-08-10 RX ADMIN — METRONIDAZOLE 500 MG: 500 TABLET ORAL at 06:10

## 2024-08-10 RX ADMIN — KETOROLAC TROMETHAMINE 15 MG: 30 INJECTION, SOLUTION INTRAMUSCULAR at 18:40

## 2024-08-10 RX ADMIN — KETOROLAC TROMETHAMINE 15 MG: 30 INJECTION, SOLUTION INTRAMUSCULAR at 04:40

## 2024-08-10 RX ADMIN — KETOROLAC TROMETHAMINE 15 MG: 30 INJECTION, SOLUTION INTRAMUSCULAR at 11:34

## 2024-08-10 ASSESSMENT — PAIN SCALES - GENERAL
PAINLEVEL_OUTOF10: 5
PAINLEVEL_OUTOF10: 4

## 2024-08-10 ASSESSMENT — PAIN DESCRIPTION - ORIENTATION: ORIENTATION: MID;LOWER

## 2024-08-10 ASSESSMENT — PAIN DESCRIPTION - LOCATION: LOCATION: ABDOMEN

## 2024-08-10 ASSESSMENT — PAIN - FUNCTIONAL ASSESSMENT: PAIN_FUNCTIONAL_ASSESSMENT: ACTIVITIES ARE NOT PREVENTED

## 2024-08-10 ASSESSMENT — PAIN DESCRIPTION - DESCRIPTORS: DESCRIPTORS: SORE;TENDER

## 2024-08-10 NOTE — FLOWSHEET NOTE
Pt admitted to 322 from L&D. Oriented to room, surroundings and handouts. PPD and [place] papers given and explained. Pt already reived the Tdap vaccine. Instructed to call RN for any needs.

## 2024-08-10 NOTE — PROGRESS NOTES
The patient received a Tdap shot 6/28/24 ; therefore, does not need another shot at this time.Yuri Sampson [facility], [facility], 8/9/2024 9:07 PM

## 2024-08-10 NOTE — PROGRESS NOTES
[employer] [place] Hearing screening results were discussed with parent. Questions answered. Brochure given to parent. Advised to monitor developmental milestones and contact physician for any concerns.   Ronit Simon, AuD

## 2024-08-10 NOTE — FLOWSHEET NOTE
Leonardo removed. Pt up to BR. Gait steady. Pt unable to void. DTV [id number]. Beata care done. Pt back to bed, tolerated well.

## 2024-08-10 NOTE — PROGRESS NOTES
POD #1    Patient:  Charlie Jessu     Admit Date:  8/8/2024  5:36 AM  Medical Record Number:  [id number]   Today's Date: 8/10/2024    S: No complaints; tolerating diet: yes -general; ambulating well: yes -up in room and in halls; voiding without difficulty:  yes -has no urinary complaints; bm: denies; flatus: yes -normal; pain meds appropriate: yes -IV Toradol and Tylenol to supplement the Duramorph spinal the first 24 hours, did not use the Roxicodone; vaginal bleeding: Lighter than a period.    O:   Vitals:    08/10/24 0835 08/10/24 0844 08/10/24 1150 08/10/24 1605   BP: 123/63  123/67 127/66   Pulse: (!) 108 (!) 102 79 99   Resp: 18 16 18 16   Temp: 97.4 °F (36.3 °C)  97.4 °F (36.3 °C) 97.4 °F (36.3 °C)   TempSrc: Oral  Oral Oral   SpO2:  98%       Gen: A&O, cooperative, pleasant   Heart: RRR   Lungs: CTA b/l   Abd; soft, NT, non distended, +BS  Back: no CVA or paraspinal tenderness   Ext: No clubbing, cyanosis, edema:1+ bilateral pedal, no cords palpable, no calf tenderness   Neuro: intact   Inc: clean, dry, intact with Mepilex, no drainage/staining  Uterus: firm, well contracted, nt   Beata pad: staining only, thin lochia    Recent Results (from the past 72 hour(s))   POC AmniSure    Collection Time: 08/08/24 12:00 AM   Result Value Ref Range    Amnisure, POC Positive Negative    Lot Number [id number]     Positive QC Pass/Fail Pass     Negative QC Pass/Fail Pass    CBC    Collection Time: 08/08/24  8:15 AM   Result Value Ref Range    WBC 7.6 4.5 - 11.5 k/uL    RBC 4.26 3.50 - 5.50 m/uL    Hemoglobin 11.8 11.5 - 15.5 g/dL    Hematocrit 36.9 34.0 - 48.0 %    MCV 86.6 80.0 - 99.9 fL    MCH 27.7 26.0 - 35.0 pg    MCHC 32.0 32.0 - 34.5 g/dL    RDW 14.3 11.5 - 15.0 %    Platelets 227 130 - 450 k/uL    MPV 10.8 7.0 - 12.0 fL   TYPE AND SCREEN    Collection Time: 08/08/24  8:15 AM   Result Value Ref Range    Blood Bank Sample Expiration 08/11/2024,1663     Arm Band Number [id number]     ABO/Rh O NEGATIVE     Antibody Screen

## 2024-08-11 VITALS
OXYGEN SATURATION: 98 % | TEMPERATURE: 97.6 F | RESPIRATION RATE: 16 BRPM | HEART RATE: 89 BPM | DIASTOLIC BLOOD PRESSURE: 79 MMHG | SYSTOLIC BLOOD PRESSURE: 120 MMHG

## 2024-08-11 PROBLEM — O36.63X0 LARGE FOR DATES FETUS AFFECTING PREGNANCY IN THIRD TRIMESTER, ANTEPARTUM: Status: RESOLVED | Noted: 2024-08-09 | Resolved: 2024-08-11

## 2024-08-11 PROBLEM — O99.513 ASTHMA AFFECTING PREGNANCY IN THIRD TRIMESTER: Status: RESOLVED | Noted: 2024-08-09 | Resolved: 2024-08-11

## 2024-08-11 PROBLEM — D62 ACUTE BLOOD LOSS AS CAUSE OF POSTOPERATIVE ANEMIA: Status: RESOLVED | Noted: 2024-08-10 | Resolved: 2024-08-11

## 2024-08-11 PROBLEM — Z34.03 PRIMIGRAVIDA IN THIRD TRIMESTER: Status: RESOLVED | Noted: 2024-02-08 | Resolved: 2024-08-11

## 2024-08-11 PROBLEM — O99.213 OBESITY AFFECTING PREGNANCY IN THIRD TRIMESTER: Status: RESOLVED | Noted: 2024-08-09 | Resolved: 2024-08-11

## 2024-08-11 PROBLEM — J45.909 ASTHMA AFFECTING PREGNANCY IN THIRD TRIMESTER: Status: RESOLVED | Noted: 2024-08-09 | Resolved: 2024-08-11

## 2024-08-11 PROBLEM — O26.893 RH NEGATIVE STATUS DURING PREGNANCY IN THIRD TRIMESTER: Status: RESOLVED | Noted: 2024-08-09 | Resolved: 2024-08-11

## 2024-08-11 PROBLEM — Z67.91 RH NEGATIVE STATE IN ANTEPARTUM PERIOD: Status: RESOLVED | Noted: 2024-02-08 | Resolved: 2024-08-11

## 2024-08-11 PROBLEM — O26.899 RH NEGATIVE STATE IN ANTEPARTUM PERIOD: Status: RESOLVED | Noted: 2024-02-08 | Resolved: 2024-08-11

## 2024-08-11 PROBLEM — Z3A.39 39 WEEKS GESTATION OF PREGNANCY: Status: RESOLVED | Noted: 2024-08-08 | Resolved: 2024-08-11

## 2024-08-11 PROBLEM — Z67.91 RH NEGATIVE STATUS DURING PREGNANCY IN THIRD TRIMESTER: Status: RESOLVED | Noted: 2024-08-09 | Resolved: 2024-08-11

## 2024-08-11 PROCEDURE — 6370000000 HC RX 637 (ALT 250 FOR IP): Performed by: OBSTETRICS & GYNECOLOGY

## 2024-08-11 RX ORDER — ACETAMINOPHEN 500 MG
1000 TABLET ORAL EVERY 6 HOURS PRN
COMMUNITY
Start: 2024-08-11

## 2024-08-11 RX ORDER — OXYCODONE HYDROCHLORIDE 5 MG/1
5 TABLET ORAL EVERY 6 HOURS PRN
Qty: 12 TABLET | Refills: 0 | Status: SHIPPED | OUTPATIENT
Start: 2024-08-11 | End: 2024-08-14

## 2024-08-11 RX ORDER — SIMETHICONE 80 MG
80 TABLET,CHEWABLE ORAL EVERY 6 HOURS PRN
COMMUNITY
Start: 2024-08-11

## 2024-08-11 RX ORDER — PSEUDOEPHEDRINE HCL 30 MG
100 TABLET ORAL 2 TIMES DAILY PRN
COMMUNITY
Start: 2024-08-11

## 2024-08-11 RX ORDER — MODIFIED LANOLIN
1 OINTMENT (GRAM) TOPICAL
COMMUNITY
Start: 2024-08-11

## 2024-08-11 RX ORDER — IBUPROFEN 600 MG/1
600 TABLET ORAL EVERY 6 HOURS PRN
Qty: 60 TABLET | Refills: 1 | Status: SHIPPED | OUTPATIENT
Start: 2024-08-11

## 2024-08-11 RX ADMIN — OXYCODONE HYDROCHLORIDE 10 MG: 5 TABLET ORAL at 00:44

## 2024-08-11 RX ADMIN — IBUPROFEN 600 MG: 600 TABLET, FILM COATED ORAL at 04:10

## 2024-08-11 RX ADMIN — ACETAMINOPHEN 1000 MG: 500 TABLET ORAL at 08:13

## 2024-08-11 RX ADMIN — PRENATAL WITH FERROUS FUM AND FOLIC ACID 1 TABLET: 3080; 920; 120; 400; 22; 1.84; 3; 20; 10; 1; 12; 200; 27; 25; 2 TABLET ORAL at 08:12

## 2024-08-11 RX ADMIN — ACETAMINOPHEN 1000 MG: 500 TABLET ORAL at 00:44

## 2024-08-11 RX ADMIN — METRONIDAZOLE 500 MG: 500 TABLET ORAL at 06:34

## 2024-08-11 RX ADMIN — SIMETHICONE 80 MG: 80 TABLET, CHEWABLE ORAL at 08:12

## 2024-08-11 RX ADMIN — OXYCODONE HYDROCHLORIDE 10 MG: 5 TABLET ORAL at 08:13

## 2024-08-11 RX ADMIN — DOCUSATE SODIUM 100 MG: 100 CAPSULE, LIQUID FILLED ORAL at 08:12

## 2024-08-11 RX ADMIN — IBUPROFEN 600 MG: 600 TABLET, FILM COATED ORAL at 11:43

## 2024-08-11 ASSESSMENT — PAIN - FUNCTIONAL ASSESSMENT
PAIN_FUNCTIONAL_ASSESSMENT: ACTIVITIES ARE NOT PREVENTED
PAIN_FUNCTIONAL_ASSESSMENT: ACTIVITIES ARE NOT PREVENTED

## 2024-08-11 ASSESSMENT — PAIN DESCRIPTION - ORIENTATION
ORIENTATION: LOWER
ORIENTATION: LOWER

## 2024-08-11 ASSESSMENT — PAIN DESCRIPTION - LOCATION
LOCATION: ABDOMEN;INCISION
LOCATION: ABDOMEN;INCISION

## 2024-08-11 ASSESSMENT — PAIN SCALES - GENERAL
PAINLEVEL_OUTOF10: 8
PAINLEVEL_OUTOF10: 3
PAINLEVEL_OUTOF10: 4
PAINLEVEL_OUTOF10: 8

## 2024-08-11 ASSESSMENT — PAIN DESCRIPTION - DESCRIPTORS: DESCRIPTORS: ACHING;CRAMPING;DISCOMFORT;SORE;TENDER

## 2024-08-11 NOTE — DISCHARGE INSTR - DIET

## 2024-08-11 NOTE — DISCHARGE SUMMARY
delivered, current hospitalization      simethicone (MYLICON) 80 MG chewable tablet Take 1 tablet by mouth every 6 hours as needed for Flatulence      acetaminophen (TYLENOL) 500 MG tablet Take 2 tablets by mouth every 6 hours as needed for Pain           CONTINUE these medications which have NOT CHANGED    Details   DULERA 200-5 MCG/ACT inhaler Inhale 2 puffs into the lungs in the morning and 2 puffs in the evening.      Prenatal MV & Min w/FA-DHA (PRENATAL ADULT GUMMY/DHA/FA PO)       Cholecalciferol (VITAMIN D) 50 MCG (2000 UT) CAPS capsule Take by mouth      albuterol sulfate HFA (VENTOLIN HFA) 108 (90 Base) MCG/ACT inhaler Inhale 2 puffs into the lungs 4 times daily as needed for Wheezing  Qty: 2 each, Refills: 5    Associated Diagnoses: Mild intermittent asthma with acute exacerbation           Follow-Up Visit Plan: In 1 week for incision check and in 8 weeks for final postpartum visit.  Disposition: Home    Time Spent on Discharge:  30 minutes were spent in patient examination, evaluation, counseling as well as medication reconciliation, prescriptions for required medications, discharge plan and follow up.      Brando Ivan MD MD,FACOG    8/11/2024 2:33 PM

## 2024-08-11 NOTE — PROGRESS NOTES
Amber discharge completed and verified. Discharge instructions read to mother regarding herself and  care. Mother states she has a safe place for baby to sleep. Prescription understood. Verbalized understanding. No further questions at this time. ID bands on baby verified with mothers. Hugs tag removed. Patient discharged via wheelchair holding baby in car seat.

## 2024-08-11 NOTE — DISCHARGE INSTR - ACTIVITY
After Your Delivery Discharge Instructions    What to do after you leave the hospital:  Recommended diet: regular diet    Recommended activity: No driving for 2 weeks.  No sex or tampon use for 6 weeks. No douching.  No heavy lifting (greater than baby and carrier) for 8 weeks.  Initially, avoid frequent ambulation with stairs - start slowly then increase as tolerated.  You may return to work in 8 weeks.  Showers are fine - avoid bath tubs, hot tubs, swimming.    Follow-up in 1 week for incision check and in 8 weeks for final postpartum visit.    Call the Physician with any of these  signs and symptoms:    Warning signs regarding incision:  \"Popping\" of stitches or staples  Foul smelling discharge or pus  More redness or streaks around surgical incision than before    Incision care:  Keep incision uncovered  No tub baths until OK'd by your Physician      After your delivery - signs and symptoms to watch for:  Fever - Oral temperature greater than 100.4 degrees Fahrenheit  Foul-smelling vaginal discharge  Headache unrelieved by \"pain meds\"  Difficulty urinating  Breasts reddened, hard, hot to the touch  Nipple discharge which is foul-smelling or contains pus  Increased pain at the site of the surgical incision  Difficulty breathing with or without chest pain  New calf pain especially if only on one side  Sudden, continuing increased vaginal bleeding (heavier than a period) with or without clots  Unrelieved feelings of:  Inability to cope  Sadness  Anxiety  Lack of interest in baby  Insomnia  Crying     What to do at home:  Resume activity gradually   Don't lift anything heavier than baby and carrier until OK'd by your Physician   No sex until OK'd by your Physician  Eat regular nutritious meals  Take pain medication as prescribed whenever you need them  To avoid/relieve constipation take stool softeners if advised   Increase fiber in your diet    Most importantly ENJOY your Baby!  - Dr. WILSON =)))    Please call

## 2024-08-11 NOTE — PROGRESS NOTES
POD #2    Patient:  Charlie Jesus     Admit Date:  2024  5:36 AM  Medical Record Number:  [id number]   Today's Date: 2024    S: No complaints, doing well, would like a discharge home today; tolerating diet: yes -general; ambulating well: yes -up in room and in halls; voiding without difficulty:  yes -has no urinary complaints; bm: yes -normal; flatus: yes -normal; pain meds appropriate: yes -ibuprofen, Roxicodone and Tylenol; vaginal bleeding: Less than a period.    O:   Vitals:    08/10/24 1605 08/10/24 2344 24 0044 24 0637   BP: 127/66 118/66  120/79   Pulse: 99 83  89   Resp: 16 16 16 16   Temp: 97.4 °F (36.3 °C) 97.9 °F (36.6 °C)  97.6 °F (36.4 °C)   TempSrc: Oral Oral  Oral   SpO2: 98%   98%     Gen: A&O, cooperative, pleasant   Heart: RRR   Lungs: CTA b/l   Abd; soft, NT, non distended, +BS  Back: no CVA or paraspinal tenderness   Ext: No clubbing, cyanosis, edema:1+ , no cords palpable, no calf tenderness   Neuro: intact   Inc: clean, dry, intact with Mepilex, no drainage or staining  Uterus: firm, well contracted, nt   Beata pad: staining only, thin lochia    Recent Results (from the past 72 hour(s))   CBC    Collection Time: 08/10/24  6:43 AM   Result Value Ref Range    WBC 11.4 4.5 - 11.5 k/uL    RBC 3.83 3.50 - 5.50 m/uL    Hemoglobin 10.6 (L) 11.5 - 15.5 g/dL    Hematocrit 33.0 (L) 34.0 - 48.0 %    MCV 86.2 80.0 - 99.9 fL    MCH 27.7 26.0 - 35.0 pg    MCHC 32.1 32.0 - 34.5 g/dL    RDW 14.6 11.5 - 15.0 %    Platelets 198 130 - 450 k/uL    MPV 10.6 7.0 - 12.0 fL       A: 34 y.o. female  at 39w1d  POD#2 S/P primary low transverse  section for arrest of dilation, prolonged rupture of membrane  Breast and bottle feeding  Postoperative needs acute blood loss  Obesity (pregravid BMI 39.16)  Rh- status  Vitamin D insufficiency  Asthma -on Dulera and albuterol inhaler as needed   Patient Active Problem List   Diagnosis    Asthma    Mixed hyperlipidemia    Rh negative state in

## 2024-08-11 NOTE — DISCHARGE INSTRUCTIONS
if you are saturating more than one maxi pad in an hour.    BREAST CARE  Take medications as recommended by your doctor or midwife for pain  If you develop a warm, red, tender area on your breast or develop a fever contact your OB provider.    For breastfeeding moms:  If you become engorged, feeding may be more difficult or painful for 1-2 days.  You may find it helpful to hand express some milk so that the infant can latch on more easily.   While breastfeeding, continue to take your prenatal vitamins as directed by your doctor or midwife.   Only take medications verified as safe for breastfeeding.    For non-breastfeeding moms:  You may apply ice packs to your breasts over your bra for twenty minutes at a time for comfort.  Avoid stimulation to your breasts, when showering allow the water to strike your back not your breasts.    Wear a good fitting bra until your milk dries, such as a sports bra.    INCISIONAL CARE / AILEEN CARE  Clean your incision in the shower with mild soap.  After shower pat the incision area dry and leave open to air.  If used, Steri-stipes should be removed by 2 weeks.  If used, Staples should be removed by the OB in office by 1 week.  If used/ordered, an abdominal binder may provide support for your incision.   Use the aileen-bottle after toileting until bleeding stops.  Cleanse your perineum from front to back  If used, stitches or internal clips will dissolve in 4-6 weeks.  You may use a sitz bath or soak in a clean tub as needed for comfort.  Kegel exercises will help restore bladder control.     SWELLING  Keep your legs elevated when sitting or lying.   When wearing stocking or socks, make sure they are not too tight.    WHEN TO CALL THE DOCTOR  If you have a temp of 100.4 or more.   If your bleeding has increased and you are saturating a pad in an hour.  Your abdomen is tender to touch.  You are passing blood clots bigger than the size of a lemon.  If you are experiencing extreme weakness

## 2024-08-11 NOTE — PROGRESS NOTES
Patient completed [facility] discharge education for mom and baby. Paperwork in chart.

## 2024-08-11 NOTE — PLAN OF CARE
Problem: Discharge Planning  Goal: Discharge to home or other facility with appropriate resources  Outcome: Progressing     Problem: Pain  Goal: Verbalizes/displays adequate comfort level or baseline comfort level  2024 by Deepthi Griffin RN  Outcome: Progressing  Flowsheets (Taken 2024 003)  Verbalizes/displays adequate comfort level or baseline comfort level:   Encourage patient to monitor pain and request assistance   Assess pain using appropriate pain scale   Administer analgesics based on type and severity of pain and evaluate response   Implement non-pharmacological measures as appropriate and evaluate response   Consider cultural and social influences on pain and pain management   Notify Licensed Independent Practitioner if interventions unsuccessful or patient reports new pain  8/10/2024 130 by Baylee Hong RN  Outcome: Progressing     Problem: Postpartum  Goal: Experiences normal postpartum course  Description:  Postpartum OB-Pregnancy care plan goal which identifies if the mother is experiencing a normal postpartum course  2024 by Deepthi Griffin RN  Outcome: Progressing  8/10/2024 130 by Baylee Hong RN  Outcome: Progressing  Goal: Appropriate maternal -  bonding  Description:  Postpartum OB-Pregnancy care plan goal which identifies if the mother and  are bonding appropriately  2024 by Deepthi Griffin RN  Outcome: Progressing  8/10/2024 130 by Baylee Hong RN  Outcome: Progressing  Goal: Establishment of infant feeding pattern  Description:  Postpartum OB-Pregnancy care plan goal which identifies if the mother is establishing a feeding pattern with their   2024 by Deepthi Griffin RN  Outcome: Progressing  8/10/2024 130 by Baylee Hong RN  Outcome: Progressing     Problem: Infection - Adult  Goal: Absence of infection at discharge  Outcome: Progressing  Goal: Absence of infection during

## 2024-08-12 ENCOUNTER — CARE COORDINATION (OUTPATIENT)
Dept: OTHER | Facility: CLINIC | Age: 34
End: 2024-08-12

## 2024-08-12 NOTE — ANESTHESIA POSTPROCEDURE EVALUATION
Department of Anesthesiology  Postprocedure Note    Patient: Charlie Jesus  MRN: [id number]  YOB: 1990  Date of evaluation: 2024    Procedure Summary       Date: 24 Room / Location: [address]    Anesthesia Start:  Anesthesia Stop: 24    Procedures:        SECTION (Uterus)      Labor Analgesia Diagnosis:       Failure to progress in labor      (Failure to progress in labor [O62.2])    Surgeons: Brando Ivan MD Responsible Provider: Stacy Terrell DO    Anesthesia Type: Epidural ASA Status: 3            Anesthesia Type: Epidural    Brittney Phase I: Brittney Score: 10    Brittney Phase II:      Anesthesia Post Evaluation    Patient location during evaluation: PACU  Patient participation: complete - patient participated  Level of consciousness: awake and alert  Airway patency: patent  Nausea & Vomiting: no nausea and no vomiting  Cardiovascular status: hemodynamically stable  Respiratory status: acceptable  Hydration status: euvolemic  Pain management: adequate        No notable events documented.

## 2024-08-13 ENCOUNTER — CARE COORDINATION (OUTPATIENT)
Dept: OTHER | Facility: CLINIC | Age: 34
End: 2024-08-13

## 2024-08-13 NOTE — CARE COORDINATION
Patient eligible for [facility] Maternity Management Program    Maternity Care Manager contacted the patient by telephone to discuss the maternity management program.  Patient agrees to care management services at this time. Verified name and  with patient as identifiers.   Patient Current Location: [place]      Call within 2 business days of discharge: Yes     Last Discharge Facility       Date Complaint Diagnosis Description Type Department Provider    24 Rupture of Membranes; Contractions Postoperative pain ... Admission (Discharged) Brando Mcguire MD       Risk Factors Identified:  primary c/s failure to progress      Needs to be reviewed by the provider   none         Method of communication with provider : none    Advance Care Planning:   Does patient have an Advance Directive:  not on file.     Does patient have OB/Gyn Selected? Yes    Discussed follow up appointments. If no appointment was previously scheduled, appointment scheduling offered: Yes  BS follow up appointment(s):   Future Appointments   Date Time Provider Department Center   2024  1:00 PM Nely Crowell APRN - CNM [facility]   10/4/2024 11:30 AM Celine Carolina APRN - CNP [facility]   2025 10:30 AM Kailyn Hudson DO [facility]     Non-[facility] follow up appointment(s): n/a     OB History:   OB History    Para Term  AB Living   1 1 1 0 0 1   SAB IAB Ectopic Molar Multiple Live Births   0 0 0 0 0 1      # Outcome Date GA Lbr Alvin/2nd Weight Sex Type Anes PTL Lv   1 Term 24 39w1d  3.59 kg (7 lb 14.6 oz) M CS-LTranv EPI N ANKITA      Complications: Failure to Progress in First Stage       Unknown    Medication reconciliation was performed with patient, who verbalizes understanding of administration of home medications. Advised obtaining a 90-day supply of all daily and as-needed medications.     Barriers/Support

## 2024-08-28 ENCOUNTER — CARE COORDINATION (OUTPATIENT)
Dept: OTHER | Facility: CLINIC | Age: 34
End: 2024-08-28

## 2025-04-16 ENCOUNTER — ANCILLARY PROCEDURE (OUTPATIENT)
Dept: EMERGENCY DEPT | Age: 35
End: 2025-04-16
Attending: STUDENT IN AN ORGANIZED HEALTH CARE EDUCATION/TRAINING PROGRAM
Payer: COMMERCIAL

## 2025-04-16 ENCOUNTER — HOSPITAL ENCOUNTER (EMERGENCY)
Age: 35
Discharge: HOME | End: 2025-04-16
Attending: STUDENT IN AN ORGANIZED HEALTH CARE EDUCATION/TRAINING PROGRAM
Payer: COMMERCIAL

## 2025-04-16 VITALS
RESPIRATION RATE: 16 BRPM | HEART RATE: 97 BPM | DIASTOLIC BLOOD PRESSURE: 97 MMHG | HEIGHT: 63 IN | TEMPERATURE: 98.1 F | BODY MASS INDEX: 39.16 KG/M2 | SYSTOLIC BLOOD PRESSURE: 144 MMHG | WEIGHT: 221 LBS | OXYGEN SATURATION: 99 %

## 2025-04-16 DIAGNOSIS — N20.0 KIDNEY STONE: Primary | ICD-10-CM

## 2025-04-16 DIAGNOSIS — R10.9 RIGHT FLANK PAIN: ICD-10-CM

## 2025-04-16 LAB
ALBUMIN SERPL-MCNC: 4.4 G/DL (ref 3.5–5.2)
ALP SERPL-CCNC: 101 U/L (ref 35–104)
ALT SERPL-CCNC: 31 U/L (ref 0–32)
ANION GAP SERPL CALCULATED.3IONS-SCNC: 13 MMOL/L (ref 7–16)
AST SERPL-CCNC: 19 U/L (ref 0–31)
BASOPHILS # BLD: 0.07 K/UL (ref 0–0.2)
BASOPHILS NFR BLD: 1 % (ref 0–2)
BILIRUB DIRECT SERPL-MCNC: <0.2 MG/DL (ref 0–0.3)
BILIRUB INDIRECT SERPL-MCNC: NORMAL MG/DL (ref 0–1)
BILIRUB SERPL-MCNC: 0.2 MG/DL (ref 0–1.2)
BILIRUB UR QL STRIP: NEGATIVE
BUN SERPL-MCNC: 14 MG/DL (ref 6–20)
CALCIUM SERPL-MCNC: 8.9 MG/DL (ref 8.6–10.2)
CHLORIDE SERPL-SCNC: 102 MMOL/L (ref 98–107)
CLARITY UR: CLEAR
CO2 SERPL-SCNC: 22 MMOL/L (ref 22–29)
COLOR UR: YELLOW
CREAT SERPL-MCNC: 0.6 MG/DL (ref 0.5–1)
EOSINOPHIL # BLD: 0.31 K/UL (ref 0.05–0.5)
EOSINOPHILS RELATIVE PERCENT: 3 % (ref 0–6)
ERYTHROCYTE [DISTWIDTH] IN BLOOD BY AUTOMATED COUNT: 13.4 % (ref 11.5–15)
GFR, ESTIMATED: >90 ML/MIN/1.73M2
GLUCOSE SERPL-MCNC: 95 MG/DL (ref 74–99)
GLUCOSE UR STRIP-MCNC: NEGATIVE MG/DL
HCG, URINE, POC: NEGATIVE
HCT VFR BLD AUTO: 40.5 % (ref 34–48)
HGB BLD-MCNC: 13.2 G/DL (ref 11.5–15.5)
HGB UR QL STRIP.AUTO: ABNORMAL
IMM GRANULOCYTES # BLD AUTO: 0.04 K/UL (ref 0–0.58)
IMM GRANULOCYTES NFR BLD: 0 % (ref 0–5)
KETONES UR STRIP-MCNC: NEGATIVE MG/DL
LEUKOCYTE ESTERASE UR QL STRIP: NEGATIVE
LYMPHOCYTES NFR BLD: 3.94 K/UL (ref 1.5–4)
LYMPHOCYTES RELATIVE PERCENT: 38 % (ref 20–42)
Lab: NORMAL
MCH RBC QN AUTO: 28.4 PG (ref 26–35)
MCHC RBC AUTO-ENTMCNC: 32.6 G/DL (ref 32–34.5)
MCV RBC AUTO: 87.1 FL (ref 80–99.9)
MONOCYTES NFR BLD: 0.86 K/UL (ref 0.1–0.95)
MONOCYTES NFR BLD: 8 % (ref 2–12)
NEGATIVE QC PASS/FAIL: NORMAL
NEUTROPHILS NFR BLD: 50 % (ref 43–80)
NEUTS SEG NFR BLD: 5.25 K/UL (ref 1.8–7.3)
NITRITE UR QL STRIP: NEGATIVE
PH UR STRIP: 7 [PH] (ref 5–8)
PLATELET # BLD AUTO: 275 K/UL (ref 130–450)
PMV BLD AUTO: 9.8 FL (ref 7–12)
POSITIVE QC PASS/FAIL: NORMAL
POTASSIUM SERPL-SCNC: 3.7 MMOL/L (ref 3.5–5)
PROT SERPL-MCNC: 7.4 G/DL (ref 6.4–8.3)
PROT UR STRIP-MCNC: ABNORMAL MG/DL
RBC # BLD AUTO: 4.65 M/UL (ref 3.5–5.5)
RBC #/AREA URNS HPF: ABNORMAL /HPF
SODIUM SERPL-SCNC: 137 MMOL/L (ref 132–146)
SP GR UR STRIP: 1.01 (ref 1–1.03)
UROBILINOGEN UR STRIP-ACNC: 0.2 EU/DL (ref 0–1)
WBC #/AREA URNS HPF: ABNORMAL /HPF
WBC OTHER # BLD: 10.5 K/UL (ref 4.5–11.5)

## 2025-04-16 PROCEDURE — 96374 THER/PROPH/DIAG INJ IV PUSH: CPT

## 2025-04-16 PROCEDURE — 96375 TX/PRO/DX INJ NEW DRUG ADDON: CPT

## 2025-04-16 PROCEDURE — 87086 URINE CULTURE/COLONY COUNT: CPT

## 2025-04-16 PROCEDURE — 99284 EMERGENCY DEPT VISIT MOD MDM: CPT

## 2025-04-16 PROCEDURE — 2580000003 HC RX 258: Performed by: STUDENT IN AN ORGANIZED HEALTH CARE EDUCATION/TRAINING PROGRAM

## 2025-04-16 PROCEDURE — 82248 BILIRUBIN DIRECT: CPT

## 2025-04-16 PROCEDURE — 76775 US EXAM ABDO BACK WALL LIM: CPT

## 2025-04-16 PROCEDURE — 80053 COMPREHEN METABOLIC PANEL: CPT

## 2025-04-16 PROCEDURE — 85025 COMPLETE CBC W/AUTO DIFF WBC: CPT

## 2025-04-16 PROCEDURE — 81001 URINALYSIS AUTO W/SCOPE: CPT

## 2025-04-16 PROCEDURE — 6360000002 HC RX W HCPCS: Performed by: STUDENT IN AN ORGANIZED HEALTH CARE EDUCATION/TRAINING PROGRAM

## 2025-04-16 RX ORDER — ONDANSETRON 4 MG/1
4 TABLET, ORALLY DISINTEGRATING ORAL 3 TIMES DAILY PRN
Qty: 21 TABLET | Refills: 0 | Status: SHIPPED | OUTPATIENT
Start: 2025-04-16

## 2025-04-16 RX ORDER — ONDANSETRON 2 MG/ML
4 INJECTION INTRAMUSCULAR; INTRAVENOUS ONCE
Status: COMPLETED | OUTPATIENT
Start: 2025-04-16 | End: 2025-04-16

## 2025-04-16 RX ORDER — TAMSULOSIN HYDROCHLORIDE 0.4 MG/1
0.4 CAPSULE ORAL DAILY
Qty: 30 CAPSULE | Refills: 0 | Status: SHIPPED | OUTPATIENT
Start: 2025-04-16

## 2025-04-16 RX ORDER — KETOROLAC TROMETHAMINE 15 MG/ML
15 INJECTION, SOLUTION INTRAMUSCULAR; INTRAVENOUS ONCE
Status: COMPLETED | OUTPATIENT
Start: 2025-04-16 | End: 2025-04-16

## 2025-04-16 RX ORDER — 0.9 % SODIUM CHLORIDE 0.9 %
1000 INTRAVENOUS SOLUTION INTRAVENOUS ONCE
Status: COMPLETED | OUTPATIENT
Start: 2025-04-16 | End: 2025-04-16

## 2025-04-16 RX ADMIN — KETOROLAC TROMETHAMINE 15 MG: 15 INJECTION, SOLUTION INTRAMUSCULAR; INTRAVENOUS at 05:38

## 2025-04-16 RX ADMIN — SODIUM CHLORIDE 1000 ML: 0.9 INJECTION, SOLUTION INTRAVENOUS at 05:39

## 2025-04-16 RX ADMIN — ONDANSETRON 4 MG: 2 INJECTION, SOLUTION INTRAMUSCULAR; INTRAVENOUS at 05:39

## 2025-04-16 ASSESSMENT — PAIN - FUNCTIONAL ASSESSMENT: PAIN_FUNCTIONAL_ASSESSMENT: 0-10

## 2025-04-16 ASSESSMENT — PAIN SCALES - GENERAL: PAINLEVEL_OUTOF10: 5

## 2025-04-16 ASSESSMENT — PAIN DESCRIPTION - ORIENTATION: ORIENTATION: RIGHT

## 2025-04-16 ASSESSMENT — PAIN DESCRIPTION - DESCRIPTORS: DESCRIPTORS: ACHING

## 2025-04-16 ASSESSMENT — PAIN DESCRIPTION - LOCATION: LOCATION: FLANK

## 2025-04-16 NOTE — ED PROVIDER NOTES
[facility] EMERGENCY DEPARTMENT  EMERGENCY DEPARTMENT ENCOUNTER        Pt Name: Charlie Jesus  MRN: [id number]  Birthdate 1990  Date of evaluation: 2025  Provider: Jayme Krause MD  PCP: aKilyn Hudson DO  Note Started: 5:36 AM EDT 25    CHIEF COMPLAINT       Chief Complaint   Patient presents with    Flank Pain    Urinary Frequency     Hx of kidney stones       HISTORY OF PRESENT ILLNESS: 1 or more Elements        Limitations to history : None    Charlie Jesus is a 35 y.o. female who presents to the emergency room for urinary frequency and right flank pain.  Feels similar to the last time she had a kidney stone.  Has had prior stones, has passed all stones without intervention previously.  She denies any fevers or chills.  Denies any urinary frequency.  Has had nausea but no vomiting.  Denies any chest symptoms.  Has had urinary frequency, no hematuria, no dysuria.    Nursing Notes were all reviewed and agreed with or any disagreements were addressed in the HPI.      REVIEW OF EXTERNAL NOTE :       Encounter 10/4/2024 patient is a history of prior pregnancy    REVIEW OF SYSTEMS :      Positives and Pertinent negatives as per HPI.     SURGICAL HISTORY     Past Surgical History:   Procedure Laterality Date     SECTION N/A 2024     SECTION performed by Brando Ivan MD at [facility] L&D OR    CHOLECYSTECTOMY      laparoscopic    TONSILLECTOMY AND ADENOIDECTOMY      WISDOM TOOTH EXTRACTION         CURRENTMEDICATIONS       Previous Medications    ALBUTEROL SULFATE HFA (VENTOLIN HFA) 108 (90 BASE) MCG/ACT INHALER    Inhale 2 puffs into the lungs 4 times daily as needed for Wheezing    CHOLECALCIFEROL (VITAMIN D) 50 MCG (2000 UT) CAPS CAPSULE    Take by mouth    DULERA 200-5 MCG/ACT INHALER    Inhale 2 puffs into the lungs in the morning and 2 puffs in the evening.    PRENATAL MV & MIN W/FA-DHA (PRENATAL ADULT GUMMY/DHA/FA PO)           ALLERGIES

## 2025-04-17 ENCOUNTER — CARE COORDINATION (OUTPATIENT)
Dept: OTHER | Facility: CLINIC | Age: 35
End: 2025-04-17

## 2025-04-17 LAB
MICROORGANISM SPEC CULT: NO GROWTH
SERVICE CMNT-IMP: NORMAL
SPECIMEN DESCRIPTION: NORMAL

## 2025-04-17 NOTE — CARE COORDINATION
Ambulatory Care Coordination Note     4/17/2025 4:35 PM     ACM outreach attempt by this ACM today to offer care management services. ACM was unable to reach the patient by telephone today;   left voice message requesting a return phone call to this ACM.     ACM: Rain Razo RN       PCP/Specialist follow up:   Future Appointments         Provider Specialty Dept Phone    6/30/2025 10:30 AM Kailyn Hudson DO Family Medicine [phone]    4/14/2026 12:30 PM Celine Carolina, APRN - CNP Obstetrics and Gynecology [phone]            Follow Up:   Plan for next ACM outreach in approximately 1-2 days  to complete:  - outreach attempt to offer care management services.

## 2025-04-18 ENCOUNTER — CARE COORDINATION (OUTPATIENT)
Dept: OTHER | Facility: CLINIC | Age: 35
End: 2025-04-18

## 2025-04-18 NOTE — CARE COORDINATION
Ambulatory Care Coordination Note     4/18/2025 9:52 AM     ACM outreach attempt by this ACM today to offer care management services. ACM was unable to reach the patient by telephone today;   left voice message requesting a return phone call to this ACM.  yourdeliveryhart message sent requesting patient to contact this ACM.     ACM: Rain Razo RN         PCP/Specialist follow up:   Future Appointments         Provider Specialty Dept Phone    6/30/2025 10:30 AM Kailyn Hudson DO Family Medicine [phone]    4/14/2026 12:30 PM Celine Carolina, APRN - CNP Obstetrics and Gynecology [phone]            Follow Up:   Plan for next ACM outreach in approximately 2 weeks to complete:  - outreach attempt to offer care management services.

## 2025-05-01 ENCOUNTER — CARE COORDINATION (OUTPATIENT)
Dept: OTHER | Facility: CLINIC | Age: 35
End: 2025-05-01

## 2025-08-26 DIAGNOSIS — J45.21 MILD INTERMITTENT ASTHMA WITH ACUTE EXACERBATION: ICD-10-CM

## 2025-08-27 RX ORDER — ALBUTEROL SULFATE 90 UG/1
2 INHALANT RESPIRATORY (INHALATION) EVERY 4 HOURS PRN
Qty: 2 EACH | Refills: 5 | Status: SHIPPED | OUTPATIENT
Start: 2025-08-27

## (undated) DEVICE — WOUND RETRACTOR AND PROTECTOR: Brand: ALEXIS WOUND PROTECTOR-RETRACTOR

## (undated) DEVICE — APPLICATOR MEDICATED 26 CC SOLUTION HI LT ORNG CHLORAPREP

## (undated) DEVICE — 4-PORT MANIFOLD: Brand: NEPTUNE 2

## (undated) DEVICE — SUTURE CHROMIC GUT SZ 1 L36IN ABSRB BRN L40MM CT 1/2 CIR 915H

## (undated) DEVICE — SOLUTION IRRIG 500ML 0.9% SOD CHLO USP POUR PLAS BTL

## (undated) DEVICE — CESAREAN BIRTH PACK: Brand: MEDLINE INDUSTRIES, INC.

## (undated) DEVICE — 34" SINGLE PATIENT USE HOVERMATT BREATHABLE: Brand: SINGLE PATIENT USE HOVERMATT

## (undated) DEVICE — SUTURE CHROMIC GUT SZ 2-0 L36IN ABSRB BRN L36MM CT-1 1/2 923H

## (undated) DEVICE — VACUETTE® TUBE 6 ML Z SERUM CLOT ACTIVATOR 13X100 RED CAP-BLACK RING, NON-RIDGED: Brand: VACUETTE

## (undated) DEVICE — DOUBLE BASIN SET: Brand: MEDLINE INDUSTRIES, INC.

## (undated) DEVICE — SUTURE MNCRYL STRATAFIX PS 4-0 30CM

## (undated) DEVICE — SUTURE STRATAFIX SYMMETRIC SZ 1 L18IN ABSRB VLT CT1 L36CM SXPP1A404

## (undated) DEVICE — HYPODERMIC SAFETY NEEDLE: Brand: MAGELLAN

## (undated) DEVICE — SUTURE PLN GUT SZ 3-0 L27IN ABSRB YELLOWISH TAN L36MM CT-1 842H

## (undated) DEVICE — BAG SPECIMEN BIOHAZARD 6IN X 9IN

## (undated) DEVICE — Device: Brand: PORTEX

## (undated) DEVICE — GLOVE ORANGE PI 7   MSG9070

## (undated) DEVICE — CONTAINER SPEC 64OZ POLYPR PATH SNAP LOK CAP W/ LID

## (undated) DEVICE — GLOVE ORANGE PI 7 1/2   MSG9075

## (undated) DEVICE — 1LYRTR 16FR10ML 100%SILI SNAP: Brand: MEDLINE INDUSTRIES, INC.

## (undated) DEVICE — NEPTUNE E-SEP SMOKE EVACUATION PENCIL, COATED, 70MM BLADE, PUSH BUTTON SWITCH: Brand: NEPTUNE E-SEP

## (undated) DEVICE — SYRINGE MED 10ML LUERLOCK TIP W/O SFTY DISP

## (undated) DEVICE — SUTURE VICRYL + SZ 3-0 L27IN ABSRB UD L26MM SH 1/2 CIR VCP416H

## (undated) DEVICE — CONTAINER,SPEC,PNEUM TUBE,3OZ,STRL PATH: Brand: MEDLINE

## (undated) DEVICE — SUTURE VICRYL + SZ 3-0 L36IN ABSRB UD L36MM CT-1 1/2 CIR VCP944H